# Patient Record
Sex: MALE | Race: WHITE
[De-identification: names, ages, dates, MRNs, and addresses within clinical notes are randomized per-mention and may not be internally consistent; named-entity substitution may affect disease eponyms.]

---

## 2021-04-14 ENCOUNTER — HOSPITAL ENCOUNTER (EMERGENCY)
Dept: HOSPITAL 46 - ED | Age: 61
Discharge: ANOTHER HOSPITAL | End: 2021-04-14
Payer: COMMERCIAL

## 2021-04-14 VITALS — BODY MASS INDEX: 42.66 KG/M2 | WEIGHT: 315 LBS | HEIGHT: 72 IN

## 2021-04-14 DIAGNOSIS — I48.91: ICD-10-CM

## 2021-04-14 DIAGNOSIS — J96.01: ICD-10-CM

## 2021-04-14 DIAGNOSIS — M62.82: ICD-10-CM

## 2021-04-14 DIAGNOSIS — U07.1: Primary | ICD-10-CM

## 2021-04-14 DIAGNOSIS — I10: ICD-10-CM

## 2021-04-14 DIAGNOSIS — Z88.0: ICD-10-CM

## 2021-04-14 DIAGNOSIS — E66.01: ICD-10-CM

## 2021-04-14 DIAGNOSIS — N17.9: ICD-10-CM

## 2021-04-14 DIAGNOSIS — J12.82: ICD-10-CM

## 2021-04-14 DIAGNOSIS — E86.0: ICD-10-CM

## 2021-04-14 PROCEDURE — U0003 INFECTIOUS AGENT DETECTION BY NUCLEIC ACID (DNA OR RNA); SEVERE ACUTE RESPIRATORY SYNDROME CORONAVIRUS 2 (SARS-COV-2) (CORONAVIRUS DISEASE [COVID-19]), AMPLIFIED PROBE TECHNIQUE, MAKING USE OF HIGH THROUGHPUT TECHNOLOGIES AS DESCRIBED BY CMS-2020-01-R: HCPCS

## 2021-04-14 PROCEDURE — C9803 HOPD COVID-19 SPEC COLLECT: HCPCS

## 2021-04-14 NOTE — EKG
[facility]
                                    [address]
                                  [place], [place]  [address]
_________________________________________________________________________________________
                                                                 Signed   
 
 
Atrial fibrillation with rapid ventricular response
Abnormal ECG
No previous ECGs available
Confirmed by ALIZE HESTER DO ([id number]) on 4/14/2021 1:57:24 PM
 
 
 
 
 
 
 
 
 
 
 
 
 
 
 
 
 
 
 
 
 
 
 
 
 
 
 
 
 
 
 
 
 
 
 
 
 
 
 
 
 
    Electronically Signed By: ALIZE HESTER DO  04/14/21 1357
_________________________________________________________________________________________
PATIENT NAME:     JORDAN CARDOSOWAYNE                
MEDICAL RECORD #: [id number]                     Electrocardiogram             
          ACCT #: [id number]  
DATE OF BIRTH:   [birth year]                                       
PHYSICIAN:   ALIZE HESTER DO                     REPORT #: [id number]
REPORT IS CONFIDENTIAL AND NOT TO BE RELEASED WITHOUT AUTHORIZATION

## 2022-11-11 ENCOUNTER — HOSPITAL ENCOUNTER (OUTPATIENT)
Dept: HOSPITAL 46 - OPS | Age: 62
Discharge: HOME | End: 2022-11-11
Attending: SURGERY
Payer: COMMERCIAL

## 2022-11-11 VITALS — BODY MASS INDEX: 42.66 KG/M2 | HEIGHT: 72 IN | WEIGHT: 315 LBS

## 2022-11-11 DIAGNOSIS — I12.9: ICD-10-CM

## 2022-11-11 DIAGNOSIS — Z86.2: ICD-10-CM

## 2022-11-11 DIAGNOSIS — E66.01: ICD-10-CM

## 2022-11-11 DIAGNOSIS — Z98.84: ICD-10-CM

## 2022-11-11 DIAGNOSIS — Z88.0: ICD-10-CM

## 2022-11-11 DIAGNOSIS — M10.079: ICD-10-CM

## 2022-11-11 DIAGNOSIS — Z86.16: ICD-10-CM

## 2022-11-11 DIAGNOSIS — I48.0: ICD-10-CM

## 2022-11-11 DIAGNOSIS — K64.8: ICD-10-CM

## 2022-11-11 DIAGNOSIS — Z12.11: Primary | ICD-10-CM

## 2022-11-11 DIAGNOSIS — G47.33: ICD-10-CM

## 2022-11-11 DIAGNOSIS — N18.9: ICD-10-CM

## 2022-11-11 PROCEDURE — 0DJD8ZZ INSPECTION OF LOWER INTESTINAL TRACT, VIA NATURAL OR ARTIFICIAL OPENING ENDOSCOPIC: ICD-10-PCS | Performed by: SURGERY

## 2022-11-11 NOTE — NUR
11/11/22 0859 Carito Plascencia
0852- PT ARRIVES TO PACU REACTIVE TO VOICE. PT REPORTS NO PAIN OR
NAUSEA. RESP EVEN AND UNLABORED. OXYGEN SAT LOW 90'S ON RA. PT PLACED
ON 3L OXYGEN VIA CO2 NC. OXYGEN INCREASED TO MID 90'S. PT FALLS TO
SLEEP EASILY WHEN NOT BEING TALKED TO.

## 2022-11-14 NOTE — OR
[facility]
                                    [address], [place]  [address]
_________________________________________________________________________________________
                                                                 Signed   
 
 
DATE OF OPERATION:
11/11/2022
 
SURGEON:
Kesha Thomas MD
 
PREOPERATIVE DIAGNOSES:
1. Screening.
2. Resolved anemia.
 
POSTOPERATIVE DIAGNOSIS:
Minimal internal hemorrhoids.
 
PROCEDURE:
Colonoscopy without biopsy.
 
ESTIMATED BLOOD LOSS:
None.
 
INDICATIONS:
Michael is a 62-year-old obese gentleman, asked to see me for followup colonoscopy.  He
underwent a screening colonoscopy in 2012 while living in [place], [place].  He
then had his gastric sleeve done that same at [facility].  He
was told the colonoscopy was negative and followup in 10 years for repeat colonoscopy.
He has no family history of colon cancer or polyps.  He said he is down about 60 pounds.
 He is still a very large man.  He also mention COVID in April 2021.  He had to wear
oxygen day and night.  He now has to use CPAP at night for his severe sleep apnea.  His
body mass index is still very high at 57.  Recently, he was found to have mild anemia on
followup with his primary care provider.  His preoperative blood work shows the mild
anemia has resolved.  Consequently, he requires monitored anesthesia care given his
issues including the chronic renal failure.  In the office, I gave him a pamphlet on
colonoscopy.  We reviewed the nature of the test.  There is risk including, but not
limited to gas bloating, crampy abdominal pain, bleeding, perforation requiring surgery,
and missed diagnosis.  He had expressed understanding and wished to proceed. 
 
PROCEDURE IN DETAIL:
Michael was taken into our endoscopy suite and placed in the left lateral decubitus
position.  He was given monitored anesthesia care with propofol per our nurse
anesthetist.  A digital rectal exam was performed and this was unremarkable.  I really
could not appreciate any external hemorrhoids.  Good sphincter tone.  He is a large man.
 I cannot reach his prostate gland.  The adult colonoscope had been introduced and
advanced under direct visualization of camera.  Overall, it is easy to pass the scope
 
    Electronically Signed By: KESHA THOMAS MD  11/14/22 0704
_________________________________________________________________________________________
PATIENT NAME:     JORDAN CARDOSO                
MEDICAL RECORD #: [id number]            OPERATIVE REPORT              
          ACCT #: [id number]  
DATE OF BIRTH:   [birth year]            REPORT #: [id number]      
PHYSICIAN:        KESHA THOMAS MD             
PCP:              RAVINDER LAL MD           
REPORT IS CONFIDENTIAL AND NOT TO BE RELEASED WITHOUT AUTHORIZATION
 
 
                                  [facility]
                                    [address], [place]  [address]
_________________________________________________________________________________________
                                                                 Signed   
 
 
through Michael.  He did require some extra propofol as we went.  He did have a couple
of areas of liquid particulate stool matter.  Most of that was irrigated and suctioned
out.  He is going to need a double bowel prep in the future that he will have to take
very slowly because of the gastric sleeve procedure.  Nevertheless, we could see the
cecum and his ileocecal valve.  We had taken pictures throughout for photodocumentation.
 We did not find any pathology throughout the entire colon.  In the rectum, upon
retroflexion of scope, we saw just minimal internal hemorrhoid tissue.  After this, the
gas was suctioned out, colonoscope removed.  Michael tolerated the procedure quite well. 
 
RECOMMENDATIONS:
Michael can follow up in 10 years for repeat colonoscopy.  He will always need monitored
anesthesia care.  He should undergo a double bowel prep. 
 
 
 
            ________________________________________
            Kesha Thomas MD 
 
 
ALB/MODL
Job #:  [id number]
DD:  11/11/2022 09:02:47
DT:  11/11/2022 09:31:09
 
cc:            MD Kesha Montano MD
 
 
Copies:  RAVINDER LAL DMD, ANDREW L MD
~
 
 
 
 
 
 
 
 
 
 
 
    Electronically Signed By: KESHA THOMAS MD  11/14/22 0704
_________________________________________________________________________________________
PATIENT NAME:     JORDAN CARDOSO                
MEDICAL RECORD #: [id number]            OPERATIVE REPORT              
          ACCT #: [id number]  
DATE OF BIRTH:   [birth year]            REPORT #: [id number]      
PHYSICIAN:        KESHA THOMAS MD             
PCP:              RAVINDER LAL MD           
REPORT IS CONFIDENTIAL AND NOT TO BE RELEASED WITHOUT AUTHORIZATION